# Patient Record
Sex: FEMALE | Race: WHITE | URBAN - METROPOLITAN AREA
[De-identification: names, ages, dates, MRNs, and addresses within clinical notes are randomized per-mention and may not be internally consistent; named-entity substitution may affect disease eponyms.]

---

## 2017-01-03 ENCOUNTER — OFFICE VISIT (OUTPATIENT)
Dept: INTERNAL MEDICINE CLINIC | Age: 37
End: 2017-01-03

## 2017-01-03 VITALS
DIASTOLIC BLOOD PRESSURE: 88 MMHG | WEIGHT: 178 LBS | RESPIRATION RATE: 18 BRPM | BODY MASS INDEX: 28.61 KG/M2 | TEMPERATURE: 97.9 F | HEART RATE: 57 BPM | OXYGEN SATURATION: 99 % | SYSTOLIC BLOOD PRESSURE: 134 MMHG | HEIGHT: 66 IN

## 2017-01-03 DIAGNOSIS — F32.A DEPRESSION, UNSPECIFIED DEPRESSION TYPE: Primary | ICD-10-CM

## 2017-01-03 NOTE — MR AVS SNAPSHOT
Visit Information Date & Time Provider Department Dept. Phone Encounter #  
 1/3/2017  4:15 PM [facility] [phone] [id number] Follow-up Instructions Return in about 2 months (around 3/3/2017) for depression and moodiness, med check. Upcoming Health Maintenance Date Due  
 PAP AKA CERVICAL CYTOLOGY 12/14/2018 DTaP/Tdap/Td series (2 - Td) 12/14/2025 Allergies as of 1/3/2017  Review Complete On: 1/3/2017 By: Devonte Field MD  
  
 Severity Noted Reaction Type Reactions Cephalexin  04/05/2012    Hives Current Immunizations  Never Reviewed Name Date  
 TB Skin Test (PPD) Intradermal 7/18/2015 Tdap 12/14/2015  5:09 PM  
  
 Not reviewed this visit You Were Diagnosed With   
  
 Codes Comments Depression, unspecified depression type    -  Primary ICD-10-CM: F32.9 ICD-9-CM: 181 Vitals BP Pulse Temp Resp Height(growth percentile) Weight(growth percentile) 134/88 (!) 57 97.9 °F (36.6 °C) (Oral) 18 5' 6\" (1.676 m) 178 lb (80.7 kg) LMP SpO2 BMI OB Status Smoking Status 12/26/2016 99% 28.73 kg/m2 Having regular periods Never Smoker BMI and BSA Data Body Mass Index Body Surface Area 28.73 kg/m 2 1.94 m 2 Preferred Pharmacy Pharmacy Name Phone [facility] [address] Your Updated Medication List  
  
   
This list is accurate as of: 1/3/17  4:37 PM.  Always use your most recent med list.  
  
  
  
  
 buPROPion  mg tablet Commonly known as:  Carlos Misa Take 1 Tab by mouth every morning. HAIR AND SKIN PO Take  by mouth.  
  
 multivitamin tablet Commonly known as:  ONE A DAY Take 1 Tab by mouth daily. Follow-up Instructions Return in about 2 months (around 3/3/2017) for depression and moodiness, med check. Introducing [facility] & HEALTH SERVICES! Dear Edna Mireles: Thank you for requesting a [employer] account. Our records indicate that you already have an active [employer] account. You can access your account anytime at [email or web address] Did you know that you can access your hospital and ER discharge instructions at any time in [employer]? You can also review all of your test results from your hospital stay or ER visit. Additional Information If you have questions, please visit the Frequently Asked Questions section of the [employer] website at [email or web address] Remember, [employer] is NOT to be used for urgent needs. For medical emergencies, dial 911. Now available from your iPhone and Android! Please provide this summary of care documentation to your next provider. Your primary care clinician is listed as [facility]. If you have any questions after today's visit, please call [phone].

## 2017-01-03 NOTE — PROGRESS NOTES
HISTORY OF PRESENT ILLNESS  Ho Werner is a 39 y.o. female. Visit Vitals    /88    Pulse (!) 57    Temp 97.9 °F (36.6 °C) (Oral)    Resp 18    Ht 5' 6\" (1.676 m)    Wt 178 lb (80.7 kg)    LMP 12/26/2016    SpO2 99%    BMI 28.73 kg/m2       HPI Comments: Started back on wellbutrin and noted feeling better after a couple of weeks. Less stressed. Less oscar. More positive energy. Joined weight watchers. Will be going to [place] soon for a month. Depression   The history is provided by the patient. This is a chronic problem. The current episode started more than 1 week ago. The problem occurs daily. The problem has been gradually improving. The symptoms are relieved by medications (wellbutrin). Treatments tried: wellbutrin. The treatment provided moderate relief. Medication Evaluation         Review of Systems   Psychiatric/Behavioral: Positive for depression. Physical Exam   Constitutional: She is oriented to person, place, and time. She appears well-developed and well-nourished. No distress. Neurological: She is alert and oriented to person, place, and time. Skin: Skin is warm and dry. She is not diaphoretic. Psychiatric: She has a normal mood and affect. Her speech is normal and behavior is normal. Thought content normal.   Looks very upbeat today. Nursing note and vitals reviewed. ASSESSMENT and PLAN    ICD-10-CM ICD-9-CM    1. Depression, unspecified depression type F32.9 311        Doing better Will continue same. Reviewed hx again tiff related to meds    Will f/u 2 months and reassess whether she wants to stay on the wellbutrin for a while longer or try off in the summer        Incidentally weight discussed. Pt already has set up [employer] for herself.

## 2017-01-03 NOTE — PROGRESS NOTES
ROOM # 3    Pt presents today for 1 month follow up regarding restarting Wellbutrin. Pt states that she is feeling well. Pt preferred language for health care discussion is english. Is someone accompanying this pt? no    Is the patient using any DME equipment during OV? no    Depression Screening completed. Active Dx    Learning Assessment completed. Yes    Abuse Screening completed. Yes    Health Maintenance reviewed and discussed per provider. Yes, up to date    Advance Directive:  1. Do you have an advance directive in place? Patient Reply: No    2. If not, would you like material regarding how to put one in place? Patient Reply: No    Coordination of Care:  1. Have you been to the ER, urgent care clinic since your last visit? Hospitalized since your last visit? no    2. Have you seen or consulted any other health care providers outside of the [address] [place] [address] since your last visit? Include any pap smears or colon screening.  no

## 2017-04-11 ENCOUNTER — OFFICE VISIT (OUTPATIENT)
Dept: INTERNAL MEDICINE CLINIC | Age: 37
End: 2017-04-11

## 2017-04-11 VITALS
DIASTOLIC BLOOD PRESSURE: 76 MMHG | WEIGHT: 167 LBS | OXYGEN SATURATION: 98 % | HEIGHT: 66 IN | BODY MASS INDEX: 26.84 KG/M2 | HEART RATE: 75 BPM | TEMPERATURE: 97.5 F | SYSTOLIC BLOOD PRESSURE: 117 MMHG | RESPIRATION RATE: 15 BRPM

## 2017-04-11 DIAGNOSIS — F32.A DEPRESSION, UNSPECIFIED DEPRESSION TYPE: Primary | ICD-10-CM

## 2017-04-11 RX ORDER — LANOLIN ALCOHOL/MO/W.PET/CERES
500 CREAM (GRAM) TOPICAL DAILY
COMMUNITY

## 2017-04-11 NOTE — PROGRESS NOTES
HISTORY OF PRESENT ILLNESS  Sydney Pearl is a 40 y.o. female. Visit Vitals    /76    Pulse 75    Temp 97.5 °F (36.4 °C) (Oral)    Resp 15    Ht 5' 6\" (1.676 m)    Wt 167 lb (75.8 kg)    LMP 03/22/2017 (Exact Date)    SpO2 98%    BMI 26.95 kg/m2       HPI Comments: Pt feels better--more even keeled--on wellbutrin. Less anxiety. She feels like this is a good place to be. But she has questions about pregnancy. She and her  are trying to get pregnant. Reviewed the current recommendations re wellbutrin which is Category C in [address] [place] [address],[address], category B in [facility]. She will discuss with OB-GYN also      Depression   The history is provided by the patient (see comments). This is a chronic problem. The current episode started more than 1 week ago. The problem occurs daily. The problem has been rapidly improving. Pertinent negatives include no chest pain and no shortness of breath. Review of Systems   Constitutional: Negative. Respiratory: Negative for shortness of breath. Cardiovascular: Negative for chest pain and palpitations. Psychiatric/Behavioral: Positive for depression. Physical Exam   Constitutional: She is oriented to person, place, and time. She appears well-developed and well-nourished. No distress. Cardiovascular: Normal rate. Pulmonary/Chest: Effort normal.   Musculoskeletal: She exhibits no edema. Neurological: She is alert and oriented to person, place, and time. Skin: Skin is warm and dry. She is not diaphoretic. Psychiatric: She has a normal mood and affect. Nursing note and vitals reviewed. ASSESSMENT and PLAN    ICD-10-CM ICD-9-CM    1. Depression, unspecified depression type F32.9 311        Doing well. Losing weight. Staying more active and exercising. Will continue same unless ob-gyn says otherwise. Depression/anxiety doing better.      F/u 3-4 months for recheck

## 2017-04-11 NOTE — MR AVS SNAPSHOT
Visit Information Date & Time Provider Department Dept. Phone Encounter #  
 4/11/2017  4:15 PM Bhakti Raissa, [employer] [phone] [id number] Follow-up Instructions Return in about 4 months (around 7/31/2017) for depression/anxiety recheck. Lakisha Cai Upcoming Health Maintenance Date Due  
 PAP AKA CERVICAL CYTOLOGY 12/14/2018 DTaP/Tdap/Td series (2 - Td) 12/14/2025 Allergies as of 4/11/2017  Review Complete On: 4/11/2017 By: Bhakti Haji MD  
  
 Severity Noted Reaction Type Reactions Cephalexin  04/05/2012    Hives Current Immunizations  Never Reviewed Name Date  
 TB Skin Test (PPD) Intradermal 7/18/2015 Tdap 12/14/2015  5:09 PM  
  
 Not reviewed this visit You Were Diagnosed With   
  
 Codes Comments Depression, unspecified depression type    -  Primary ICD-10-CM: F32.9 ICD-9-CM: 651 Vitals BP Pulse Temp Resp Height(growth percentile) Weight(growth percentile) 117/76 75 97.5 °F (36.4 °C) (Oral) 15 5' 6\" (1.676 m) 167 lb (75.8 kg) LMP SpO2 BMI OB Status Smoking Status 03/22/2017 (Exact Date) 98% 26.95 kg/m2 Having regular periods Never Smoker Vitals History BMI and BSA Data Body Mass Index Body Surface Area  
 26.95 kg/m 2 1.88 m 2 [facility]erred [facility]acy Pharm[facility] Phone [facility], [address] Your Updated Medication List  
  
   
This list is accurate as of: 4/11/17  4:53 PM.  Always use your most recent med list.  
  
  
  
  
 buPROPion  mg tablet Commonly known as:  Almarie Gideon Take 1 Tab by mouth every morning. HAIR AND SKIN PO Take  by mouth.  
  
 multivitamin tablet Commonly known as:  ONE A DAY Take 1 Tab by mouth daily. VITAMIN B-12 500 mcg tablet Generic drug:  cyanocobalamin Take 500 mcg by mouth daily. Follow-up Instructions Return in about 4 months (around 7/31/2017) for depression/anxiety recheck. Elizabeth Sterling Introducing [facility] SERVICES! Dear Cheli Gonzalez: 
Thank you for requesting a [employer] account. Our records indicate that you already have an active [employer] account. You can access your account anytime at [email or web address] Did you know that you can access your hospital and ER discharge instructions at any time in [employer]? You can also review all of your test results from your hospital stay or ER visit. Additional Information If you have questions, please visit the Frequently Asked Questions section of the [employer] website at [email or web address] Remember, [employer] is NOT to be used for urgent needs. For medical emergencies, dial 911. Now available from your iPhone and Android! Please provide this summary of care documentation to your next provider. Your primary care clinician is listed as [facility]. If you have any questions after today's visit, please call [phone].

## 2017-04-11 NOTE — PROGRESS NOTES
ROOM # 4    Candido Polk Cecilio presents today for   Chief Complaint   Patient presents with    Depression     f/u        Candido Hernandes preferred language for health care discussion is english/other. Is someone accompanying this pt? no    Is the patient using any DME equipment during OV? no    Depression Screening:  PHQ 2 / 9, over the last two weeks 12/14/2015   Little interest or pleasure in doing things Nearly every day   Feeling down, depressed or hopeless More than half the days   Total Score PHQ 2 5       Learning Assessment:  Learning Assessment 12/14/2015   PRIMARY LEARNER Patient   PRIMARY LANGUAGE ENGLISH   LEARNER PREFERENCE PRIMARY DEMONSTRATION   ANSWERED BY Ronnie Sauceda   RELATIONSHIP SELF       Abuse Screening:  Abuse Screening Questionnaire 12/14/2015   Do you ever feel afraid of your partner? N   Are you in a relationship with someone who physically or mentally threatens you? N   Is it safe for you to go home? Y       Fall Risk  No flowsheet data found. Health Maintenance reviewed and discussed per provider. Yes        Advance Directive:  1. Do you have an advance directive in place? Patient Reply: no    2. If not, would you like material regarding how to put one in place? Patient Reply: no    Coordination of Care:  1. Have you been to the ER, urgent care clinic since your last visit? Hospitalized since your last visit? no    2. Have you seen or consulted any other health care providers outside of the [address] [place] [address] since your last visit? Include any pap smears or colon screening.  no

## 2017-06-15 DIAGNOSIS — R63.5 WEIGHT GAIN: ICD-10-CM

## 2017-06-15 DIAGNOSIS — F32.A MOOD DISORDER OF DEPRESSED TYPE: ICD-10-CM

## 2017-06-15 RX ORDER — BUPROPION HYDROCHLORIDE 150 MG/1
150 TABLET ORAL
Qty: 30 TAB | Refills: 5 | Status: SHIPPED | OUTPATIENT
Start: 2017-06-15 | End: 2017-06-16 | Stop reason: SDUPTHER

## 2017-06-15 NOTE — TELEPHONE ENCOUNTER
Requested Prescriptions     Pending Prescriptions Disp Refills    buPROPion XL (WELLBUTRIN XL) 150 mg tablet 30 Tab 5     Sig: Take 1 Tab by mouth every morning.

## 2017-06-16 DIAGNOSIS — R63.5 WEIGHT GAIN: ICD-10-CM

## 2017-06-16 DIAGNOSIS — F32.A MOOD DISORDER OF DEPRESSED TYPE: ICD-10-CM

## 2017-06-16 RX ORDER — BUPROPION HYDROCHLORIDE 150 MG/1
150 TABLET ORAL
Qty: 30 TAB | Refills: 5 | Status: SHIPPED | OUTPATIENT
Start: 2017-06-16 | End: 2019-05-01 | Stop reason: SDUPTHER

## 2018-01-31 ENCOUNTER — OFFICE VISIT (OUTPATIENT)
Dept: INTERNAL MEDICINE CLINIC | Age: 38
End: 2018-01-31

## 2018-01-31 VITALS
HEIGHT: 66 IN | WEIGHT: 177 LBS | BODY MASS INDEX: 28.45 KG/M2 | RESPIRATION RATE: 15 BRPM | DIASTOLIC BLOOD PRESSURE: 89 MMHG | SYSTOLIC BLOOD PRESSURE: 130 MMHG | OXYGEN SATURATION: 99 % | HEART RATE: 80 BPM | TEMPERATURE: 98.9 F

## 2018-01-31 DIAGNOSIS — R68.89 FLU-LIKE SYMPTOMS: Primary | ICD-10-CM

## 2018-01-31 DIAGNOSIS — J11.1 INFLUENZA: ICD-10-CM

## 2018-01-31 LAB
QUICKVUE INFLUENZA TEST: POSITIVE
VALID INTERNAL CONTROL?: YES

## 2018-01-31 RX ORDER — OSELTAMIVIR PHOSPHATE 75 MG/1
75 CAPSULE ORAL 2 TIMES DAILY
Qty: 10 CAP | Refills: 0 | Status: SHIPPED | OUTPATIENT
Start: 2018-01-31 | End: 2018-02-05

## 2018-01-31 NOTE — PROGRESS NOTES
ROOM # 4    Josr Hernandes presents today for   Chief Complaint   Patient presents with    Generalized Body Aches     flu like symptoms       Josr Hernandes preferred language for health care discussion is english/other. Is someone accompanying this pt? no    Is the patient using any DME equipment during [address]? no    Depression Screening:  PHQ over the last two weeks 4/11/2017 12/14/2015   Little interest or pleasure in doing things Several days Nearly every day   Feeling down, depressed or hopeless Several days More than half the days   Total Score PHQ 2 2 5   Trouble falling or staying asleep, or sleeping too much Several days -   Feeling tired or having little energy Several days -   Poor appetite or overeating Not at all -   Feeling bad about yourself - or that you are a failure or have let yourself or your family down Several days -   Trouble concentrating on things such as school, work, reading or watching TV Several days -   Moving or speaking so slowly that other people could have noticed; or the opposite being so fidgety that others notice Not at all -   Thoughts of being better off dead, or hurting yourself in some way Not at all -   PHQ 9 Score 6 -   How difficult have these problems made it for you to do your work, take care of your home and get along with others Not difficult at all -       Learning Assessment:  Learning Assessment 12/14/2015   PRIMARY LEARNER Patient   PRIMARY LANGUAGE ENGLISH   LEARNER PREFERENCE PRIMARY DEMONSTRATION   ANSWERED BY Lizzeth Lawton   RELATIONSHIP SELF       Abuse Screening:  Abuse Screening Questionnaire 12/14/2015   Do you ever feel afraid of your partner? N   Are you in a relationship with someone who physically or mentally threatens you? N   Is it safe for you to go home? Y       Fall Risk  No flowsheet data found. Health Maintenance reviewed and discussed per provider. Yes    Josr Hernandes is due for flu vaccine.   Please order/place referral if appropriate. Advance Directive:  1. Do you have an advance directive in place? Patient Reply: no    2. If not, would you like material regarding how to put one in place? Patient Reply: no    Coordination of Care:  1. Have you been to the ER, urgent care clinic since your last visit? Hospitalized since your last visit? no    2. Have you seen or consulted any other health care providers outside of the [address] [place] [address] since your last visit? Include any pap smears or colon screening.  no

## 2018-01-31 NOTE — PROGRESS NOTES
HISTORY OF PRESENT ILLNESS  Dakota Hull is a 40 y.o. female. Visit Vitals    /89    Pulse 80    Temp 98.9 °F (37.2 °C) (Oral)    Resp 15    Ht 5' 6\" (1.676 m)    Wt 177 lb (80.3 kg)    LMP 01/30/2018 (Exact Date)    SpO2 99%    BMI 28.57 kg/m2       HPI Comments: Slight fevers, fatigue, body aches, cough. Stayed home yesterday due to feeling ill    Generalized Body Aches   The history is provided by the patient. This is a new problem. The current episode started 2 days ago. The problem occurs daily. The problem has not changed since onset. Pertinent negatives include no shortness of breath. Review of Systems   Constitutional: Positive for fever. Negative for chills. HENT: Positive for sore throat. Negative for congestion. Respiratory: Positive for cough. Negative for sputum production and shortness of breath. Cardiovascular: Negative. Physical Exam   Constitutional: She is oriented to person, place, and time. She appears well-developed and well-nourished. No distress. HENT:   Right Ear: Tympanic membrane, external ear and ear canal normal.   Left Ear: Tympanic membrane, external ear and ear canal normal.   Nose: Mucosal edema present. Mouth/Throat: Uvula is midline, oropharynx is clear and moist and mucous membranes are normal.   Cardiovascular: Normal rate. Pulmonary/Chest: Effort normal and breath sounds normal. No respiratory distress. She has no wheezes. She has no rales. Musculoskeletal: She exhibits no edema. Neurological: She is alert and oriented to person, place, and time. Skin: Skin is warm and dry. She is not diaphoretic. Psychiatric: She has a normal mood and affect. Nursing note and vitals reviewed. ASSESSMENT and PLAN    ICD-10-CM ICD-9-CM    1. Flu-like symptoms R68.89 780.99 AMB POC RAPID INFLUENZA TEST      oseltamivir (TAMIFLU) 75 mg capsule   2.  Influenza J11.1 487.1     A strain       Flu swab + influenza A    Will tx with Tamiflu and off work the rest of the week.     Rest and fluids, tylenol

## 2018-01-31 NOTE — MR AVS SNAPSHOT
[facility] 
 
 
 [address] [facility] [address] 
[phone] Patient: Larisa Cortes MRN: [id number] BDM:[birth year] Visit Information Date & Time Provider Department Dept. Phone Encounter #  
 1/31/2018  1:30 PM Jaja Payne, 411 [facility] [id number] Follow-up Instructions Return if symptoms worsen or fail to improve. Upcoming Health Maintenance Date Due Influenza Age 5 to Adult 8/1/2017 PAP AKA CERVICAL CYTOLOGY 12/14/2018 DTaP/Tdap/Td series (2 - Td) 12/14/2025 Allergies as of 1/31/2018  Review Complete On: 1/31/2018 By: Jaja Payne MD  
  
 Severity Noted Reaction Type Reactions Cephalexin  04/05/2012    Hives Current Immunizations  Never Reviewed Name Date  
 TB Skin Test (PPD) Intradermal 7/18/2015 Tdap 12/14/2015  5:09 PM  
  
 Not reviewed this visit You Were Diagnosed With   
  
 Codes Comments Flu-like symptoms    -  Primary ICD-10-CM: R68.89 ICD-9-CM: 780.99 Influenza     ICD-10-CM: J11.1 ICD-9-CM: 487.1 A strain Vitals BP Pulse Temp Resp Height(growth percentile) Weight(growth percentile) 130/89 80 98.9 °F (37.2 °C) (Oral) 15 5' 6\" (1.676 m) 177 lb (80.3 kg) LMP SpO2 BMI OB Status Smoking Status 01/30/2018 (Exact Date) 99% 28.57 kg/m2 Having regular periods Never Smoker BMI and BSA Data Body Mass Index Body Surface Area 28.57 kg/m 2 1.93 m 2 Preferred Pharmacy Pharmacy Name Phone [facility] [address], [address] [place] [phone] [facility] Your Updated Medication List  
  
   
This list is accurate as of: 1/31/18  1:55 PM.  Always use your most recent med list.  
  
  
  
  
 buPROPion  mg tablet Commonly known as:  Ramon Dress Take 1 Tab by mouth every morning. HAIR AND SKIN PO Take  by mouth.  
  
 multivitamin tablet Commonly known as:  ONE A DAY Take 1 Tab by mouth daily. oseltamivir 75 mg capsule Commonly known as:  TAMIFLU Take 1 Cap by mouth two (2) times a day for 5 days. VITAMIN B-12 500 mcg tablet Generic drug:  cyanocobalamin Take 500 mcg by mouth daily. Prescriptions Sent to Pharmacy Refills  
 oseltamivir (TAMIFLU) 75 mg capsule 0 Sig: Take 1 Cap by mouth two (2) times a day for 5 days. Class: Normal  
 Pharmacy: [address], [address] #: [phone] Route: Oral  
  
We Performed the Following AMB POC RAPID INFLUENZA TEST [58544 CPT(R)] Follow-up Instructions Return if symptoms worsen or fail to improve. Patient Instructions Influenza (Flu): Care Instructions Your Care Instructions Influenza (flu) is an infection in the lungs and breathing passages. It is caused by the influenza virus. There are different strains, or types, of the flu virus from year to year. Unlike the common cold, the flu comes on suddenly and the symptoms, such as a cough, congestion, fever, chills, fatigue, aches, and pains, are more severe. These symptoms may last up to 10 days. Although the flu can make you feel very sick, it usually doesn't cause serious health problems. Home treatment is usually all you need for flu symptoms. But your doctor may prescribe antiviral medicine to prevent other health problems, such as pneumonia, from developing. Older people and those who have a long-term health condition, such as lung disease, are most at risk for having pneumonia or other health problems. Follow-up care is a key part of your treatment and safety. Be sure to make and go to all appointments, and call your doctor if you are having problems. It's also a good idea to know your test results and keep a list of the medicines you take. How can you care for yourself at home?  
· Get plenty of rest. 
 · Drink plenty of fluids, enough so that your urine is light yellow or clear like water. If you have kidney, heart, or liver disease and have to limit fluids, talk with your doctor before you increase the amount of fluids you drink. · Take an over-the-counter pain medicine if needed, such as acetaminophen (Tylenol), ibuprofen (Advil, Motrin), or naproxen (Aleve), to relieve fever, headache, and muscle aches. Read and follow all instructions on the label. No one younger than 20 should take aspirin. It has been linked to Reye syndrome, a serious illness. · Do not smoke. Smoking can make the flu worse. If you need help quitting, talk to your doctor about stop-smoking programs and medicines. These can increase your chances of quitting for good. · Breathe moist air from a hot shower or from a sink filled with hot water to help clear a stuffy nose. · Before you use cough and cold medicines, check the label. These medicines may not be safe for young children or for people with certain health problems. · If the skin around your nose and lips becomes sore, put some petroleum jelly on the area. · To ease coughing: ¨ Drink fluids to soothe a scratchy throat. ¨ Suck on cough drops or plain hard candy. ¨ Take an over-the-counter cough medicine that contains dextromethorphan to help you get some sleep. Read and follow all instructions on the label. ¨ Raise your head at night with an extra pillow. This may help you rest if coughing keeps you awake. · Take any prescribed medicine exactly as directed. Call your doctor if you think you are having a problem with your medicine. To avoid spreading the flu · Wash your hands regularly, and keep your hands away from your face. · Stay home from school, work, and other public places until you are feeling better and your fever has been gone for at least 24 hours. The fever needs to have gone away on its own without the help of medicine. · Ask people living with you to talk to their doctors about preventing the flu. They may get antiviral medicine to keep from getting the flu from you. · To prevent the flu in the future, get a flu vaccine every fall. Encourage people living with you to get the vaccine. · Cover your mouth when you cough or sneeze. When should you call for help? Call 911 anytime you think you may need emergency care. For example, call if: 
? · You have severe trouble breathing. ?Call your doctor now or seek immediate medical care if: 
? · You have new or worse trouble breathing. ? · You seem to be getting much sicker. ? · You feel very sleepy or confused. ? · You have a new or higher fever. ? · You get a new rash. ? Watch closely for changes in your health, and be sure to contact your doctor if: 
? · You begin to get better and then get worse. ? · You are not getting better after 1 week. Where can you learn more? Go to http://madeleine-bina.info/. Enter U785 in the search box to learn more about \"Influenza (Flu): Care Instructions. \" Current as of: May 12, 2017 Content Version: 11.4 © 4357-4907 Regaalo. Care instructions adapted under license by [employer] (which disclaims liability or warranty for this information). If you have questions about a medical condition or this instruction, always ask your healthcare professional. [place] [address] any warranty or liability for your use of this information. Introducing [facility]! Dear Kimberley Monique: 
Thank you for requesting a AlwaysFashion account. Our records indicate that you already have an active AlwaysFashion account. You can access your account anytime at [email or web address] Did you know that you can access your hospital and ER discharge instructions at any time in AlwaysFashion? You can also review all of your test results from your hospital stay or ER visit. Additional Information If you have questions, please visit the Frequently Asked Questions section of the Apprion website at [email or web address] ([email or web address]). [email or web address] Remember, Apprion is NOT to be used for urgent needs. For medical emergencies, dial 911. Now available from your iPhone and Android! Please provide this summary of care documentation to your next provider. Your primary care clinician is listed as [facility]. If you have any questions after today's visit, please call [phone].

## 2018-01-31 NOTE — PATIENT INSTRUCTIONS

## 2018-01-31 NOTE — LETTER
NOTIFICATION RETURN TO WORK / SCHOOL 
 
1/31/2018 1:52 PM 
 
Ms. Almonte Dense [facility] [address] To Whom It May Concern: 
 
Leanna Mc is currently under the care of Ajay Martinez. She will return to work/school on: Monday, February 5, 2018 If there are questions or concerns please have the patient contact our office. Sincerely, Dimas Londono MD

## 2018-02-06 ENCOUNTER — OFFICE VISIT (OUTPATIENT)
Dept: INTERNAL MEDICINE CLINIC | Age: 38
End: 2018-02-06

## 2018-02-06 VITALS
BODY MASS INDEX: 27.8 KG/M2 | SYSTOLIC BLOOD PRESSURE: 139 MMHG | HEART RATE: 65 BPM | RESPIRATION RATE: 16 BRPM | WEIGHT: 173 LBS | DIASTOLIC BLOOD PRESSURE: 90 MMHG | TEMPERATURE: 97.3 F | HEIGHT: 66 IN | OXYGEN SATURATION: 97 %

## 2018-02-06 DIAGNOSIS — R42 DIZZINESS: Primary | ICD-10-CM

## 2018-02-06 RX ORDER — MECLIZINE HCL 12.5 MG 12.5 MG/1
12.5 TABLET ORAL
Qty: 21 TAB | Refills: 0 | Status: SHIPPED | OUTPATIENT
Start: 2018-02-06 | End: 2018-02-16

## 2018-02-06 NOTE — PROGRESS NOTES
Rm 3  Pt presents to the clinic complaining of dizziness and nausea x 1 day. Pt is recovering from the flu and believes the dizziness and nausea is not related. Flu Shot Requested: no    Depression Screening:  PHQ over the last two weeks 4/11/2017 12/14/2015   Little interest or pleasure in doing things Several days Nearly every day   Feeling down, depressed or hopeless Several days More than half the days   Total Score PHQ 2 2 5   Trouble falling or staying asleep, or sleeping too much Several days -   Feeling tired or having little energy Several days -   Poor appetite or overeating Not at all -   Feeling bad about yourself - or that you are a failure or have let yourself or your family down Several days -   Trouble concentrating on things such as school, work, reading or watching TV Several days -   Moving or speaking so slowly that other people could have noticed; or the opposite being so fidgety that others notice Not at all -   Thoughts of being better off dead, or hurting yourself in some way Not at all -   PHQ 9 Score 6 -   How difficult have these problems made it for you to do your work, take care of your home and get along with others Not difficult at all -       Learning Assessment:  Learning Assessment 12/14/2015   PRIMARY LEARNER Patient   PRIMARY LANGUAGE ENGLISH   LEARNER PREFERENCE PRIMARY DEMONSTRATION   ANSWERED BY Rina Olivas   RELATIONSHIP SELF       Abuse Screening:  Abuse Screening Questionnaire 12/14/2015   Do you ever feel afraid of your partner? N   Are you in a relationship with someone who physically or mentally threatens you? N   Is it safe for you to go home? Y       Health Maintenance reviewed and discussed per provider: yes     Coordination of Care:    1. Have you been to the ER, urgent care clinic since your last visit? Hospitalized since your last visit? no    2.  Have you seen or consulted any other health care providers outside of the [address] [place] [address] since your last visit? Include any pap smears or colon screening.  no

## 2018-02-06 NOTE — MR AVS SNAPSHOT
Tammi Pulse 
 
 
 [address] [facility] 83 [address] 
[phone] Patient: Sanaz Gilmore MRN: [id number] LVU:[birth year] Visit Information Date & Time Provider Department Dept. Phone Encounter #  
 2/6/2018  8:30 AM Aldona Closs, NP [facility] [phone] [id number] Follow-up Instructions Return if symptoms worsen or fail to improve. Upcoming Health Maintenance Date Due Influenza Age 5 to Adult 8/1/2017 PAP AKA CERVICAL CYTOLOGY 12/14/2018 DTaP/Tdap/Td series (2 - Td) 12/14/2025 Allergies as of 2/6/2018  Review Complete On: 2/6/2018 By: Emanuel James LPN Severity Noted Reaction Type Reactions Cephalexin  04/05/2012    Hives Current Immunizations  Never Reviewed Name Date  
 TB Skin Test (PPD) Intradermal 7/18/2015 Tdap 12/14/2015  5:09 PM  
  
 Not reviewed this visit You Were Diagnosed With   
  
 Codes Comments Dizziness    -  Primary ICD-10-CM: Y38 ICD-9-CM: 780.4 Vitals BP Pulse Temp Resp Height(growth percentile) Weight(growth percentile) 139/90 (BP 1 Location: Right arm, BP Patient Position: Sitting) 65 97.3 °F (36.3 °C) (Oral) 16 5' 6\" (1.676 m) 173 lb (78.5 kg) LMP SpO2 BMI OB Status Smoking Status 01/30/2018 (Exact Date) 97% 27.92 kg/m2 Having regular periods Never Smoker Vitals History BMI and BSA Data Body Mass Index Body Surface Area  
 27.92 kg/m 2 1.91 m 2 Preferred Pharmacy Pharmacy Name Phone [facility] [address], [address] Your Updated Medication List  
  
   
This list is accurate as of: 2/6/18  8:52 AM.  Always use your most recent med list.  
  
  
  
  
 buPROPion  mg tablet Commonly known as:  Homero Keep Take 1 Tab by mouth every morning. HAIR AND SKIN PO Take  by mouth.  
  
 meclizine 12.5 mg tablet Commonly known as:  ANTIVERT Take 1 Tab by mouth three (3) times daily as needed for up to 10 days. Indications: Vertigo  
  
 multivitamin tablet Commonly known as:  ONE A DAY Take 1 Tab by mouth daily. VITAMIN B-12 500 mcg tablet Generic drug:  cyanocobalamin Take 500 mcg by mouth daily. Prescriptions Sent to Pharmacy Refills  
 meclizine (ANTIVERT) 12.5 mg tablet 0 Sig: Take 1 Tab by mouth three (3) times daily as needed for up to 10 days. Indications: Vertigo Class: Normal  
 Pharmacy: [address], [address] #: [phone] Route: Oral  
  
Follow-up Instructions Return if symptoms worsen or fail to improve. Patient Instructions Vertigo: Care Instructions Your Care Instructions Vertigo is the feeling that you or your surroundings are moving when there is no actual movement. It is often described as a feeling of spinning, whirling, falling, or tilting. Vertigo may make you vomit or feel nauseated. You may have trouble standing or walking and may lose your balance. Vertigo is often related to an inner ear problem, but it can have other more serious causes. If vertigo continues, you may need more tests to find its cause. Follow-up care is a key part of your treatment and safety. Be sure to make and go to all appointments, and call your doctor if you are having problems. It's also a good idea to know your test results and keep a list of the medicines you take. How can you care for yourself at home? · Do not lie flat on your back. Prop yourself up slightly. This may reduce the spinning feeling. Keep your eyes open. · Move slowly so that you do not fall. · If your doctor recommends medicine, take it exactly as directed. · Do not drive while you are having vertigo. Certain exercises, called Bermudez-Daroff exercises, can help decrease vertigo. To do Bermudez-Daroff exercises: · Sit on the edge of a bed or sofa and quickly lie down on the side that causes the worst vertigo. Lie on your side with your ear down. · Stay in this position for at least 30 seconds or until the vertigo goes away. · Sit up. If this causes vertigo, wait for it to stop. · Repeat the procedure on the other side. · Repeat this 10 times. Do these exercises 2 times a day until the vertigo is gone. When should you call for help? Call 911 anytime you think you may need emergency care. For example, call if: 
? · You passed out (lost consciousness). ? · You have symptoms of a stroke. These may include: 
¨ Sudden numbness, tingling, weakness, or loss of movement in your face, arm, or leg, especially on only one side of your body. ¨ Sudden vision changes. ¨ Sudden trouble speaking. ¨ Sudden confusion or trouble understanding simple statements. ¨ Sudden problems with walking or balance. ¨ A sudden, severe headache that is different from past headaches. ?Call your doctor now or seek immediate medical care if: 
? · Vertigo occurs with a fever, a headache, or ringing in your ears. ? · You have new or increased nausea and vomiting. ? Watch closely for changes in your health, and be sure to contact your doctor if: 
? · Vertigo gets worse or happens more often. ? · Vertigo has not gotten better after 2 weeks. Where can you learn more? Go to http://madeleine-bina.info/. Enter C116 in the search box to learn more about \"Vertigo: Care Instructions. \" Current as of: May 12, 2017 Content Version: 11.4 © 5580-6636 Image Space Media. Care instructions adapted under license by [employer] (which disclaims liability or warranty for this information). If you have questions about a medical condition or this instruction, always ask your healthcare professional. [address] any warranty or liability for your use of this information. Vertigo: Exercises Your Care Instructions Here are some examples of typical rehabilitation exercises for your condition. Start each exercise slowly. Ease off the exercise if you start to have pain. Your doctor or physical therapist will tell you when you can start these exercises and which ones will work best for you. How to do the exercises Exercise 1 1. Stand with a chair in front of you and a wall behind you. If you begin to fall, you may use them for support. 2. Stand with your feet together and your arms at your sides. 3. Move your head up and down 10 times. Exercise 2 1. Move your head side to side 10 times. Exercise 3 1. Move your head diagonally up and down 10 times. Exercise 4 1. Move your head diagonally up and down 10 times on the other side. Follow-up care is a key part of your treatment and safety. Be sure to make and go to all appointments, and call your doctor if you are having problems. It's also a good idea to know your test results and keep a list of the medicines you take. Where can you learn more? Go to http://madeleine-bina.info/. Enter F349 in the search box to learn more about \"Vertigo: Exercises. \" Current as of: May 4, 2017 Content Version: 11.4 © 0935-3734 Healthwise, Incorporated. Care instructions adapted under license by [employer]er (which disclaims liability or warranty for this information). If you have questions about a medical condition or this instruction, always ask your healthcare professional. [place] [address] any warranty or liability for your use of this information. Introducing [facility] SERVICES! Dear Lamberto Miller: 
Thank you for requesting a Solus Biosystems account. Our records indicate that you already have an active Sol[employer] Biosystems account. You can access your account anytime at [email or web address]. [email or web address] Therapeutics Did you know that you can access your hospital and ER discharge instructions at any time in [employer]o [employer]? You can also review all of your test results from your hospital stay or ER visit. Additional Information If you have questions, please visit the Frequently Asked Questions section of the [employer]o [employer]astics website at [email or web address] [email or web address] Remember, [employer]o Plastics is NOT to be used for urgent needs. For medical emergencies, dial 911. Now available from your iPhone and Android! Please provide this summary of care documentation to your next provider. Your primary care clinician is listed as [facility]. If you have any questions after today's visit, please call [phone].

## 2018-02-06 NOTE — PROGRESS NOTES
HISTORY OF PRESENT ILLNESS  Rishi Bhagat is a 40 y.o. female. Patient of Dr Raymon Goldberg presents with dizziness associated with nausea x 1 day. States she got off Flu treatment with Tamiflu but does not have any residual symptoms. States she has not taken her Wellbutrin x 3 weeks. States she felt woozy,untsable  and nauseous from sitting to standing positions and when she turned her head on one side. Patient denies any head trauma/injury, states she has had enough sleep/rest in the past four days, she believes she drinks enough water and denies any hx of anemia or migraine. Patient denies any palpitations, SOB,CP, slurred speech,unilateral weakness, facial drooping/drooling,HA. Nausea    The history is provided by the patient. This is a new problem. The problem has not changed since onset. There has been no fever. Pertinent negatives include no chills, no fever, no sweats, no abdominal pain, no diarrhea, no headaches, no arthralgias, no myalgias, no cough and no headaches. Dizziness    The history is provided by the patient. The current episode started yesterday. The problem has not changed since onset. There was no loss of consciousness. Associated symptoms include nausea and dizziness. Pertinent negatives include no visual change, no chest pain, no palpitations, no clumsiness, no confusion, no fever, no malaise/fatigue, no abdominal pain, no bowel incontinence, no bladder incontinence, no congestion, no headaches, no back pain, no focal weakness, no light-headedness, no seizures, no slurred speech, no melena, no anal bleeding and no head injury. She has tried nothing for the symptoms. Review of Systems   Constitutional: Negative. Negative for chills, fever and malaise/fatigue. HENT: Negative. Negative for congestion. Respiratory: Negative. Negative for cough. Cardiovascular: Negative for chest pain and palpitations. Gastrointestinal: Positive for nausea.  Negative for abdominal pain, anal bleeding, bowel incontinence, diarrhea and melena. Genitourinary: Negative for bladder incontinence. Musculoskeletal: Negative. Negative for arthralgias, back pain and myalgias. Skin: Negative. Neurological: Positive for dizziness. Negative for focal weakness, seizures, light-headedness and headaches. Psychiatric/Behavioral: Negative. Negative for confusion. Physical Exam   Constitutional: She is oriented to person, place, and time. She appears well-developed and well-nourished. /90 (BP 1 Location: Right arm, BP Patient Position: Sitting)  Pulse 65  Temp 97.3 °F (36.3 °C) (Oral)   Resp 16  Ht 5' 6\" (1.676 m)  Wt 173 lb (78.5 kg)  LMP 01/30/2018 (Exact Date)  SpO2 97%  BMI 27.92 kg/m2     HENT:   Head: Normocephalic and atraumatic. Eyes: Conjunctivae and EOM are normal. Pupils are equal, round, and reactive to light. Neck: Normal range of motion. Cardiovascular: Normal rate. Pulmonary/Chest: Effort normal and breath sounds normal.   Musculoskeletal: Normal range of motion. Neurological: She is alert and oriented to person, place, and time. GCS eye subscore is 4. GCS verbal subscore is 5. GCS motor subscore is 6. Skin: Skin is warm and dry. Psychiatric: She has a normal mood and affect. Her speech is normal and behavior is normal. Judgment and thought content normal. Cognition and memory are normal.   Vitals reviewed. ASSESSMENT and PLAN    ICD-10-CM ICD-9-CM    1. Dizziness R42 780.4 meclizine (ANTIVERT) 12.5 mg tablet     Encounter Diagnoses   Name Primary?  Dizziness Yes     Orders Placed This Encounter    meclizine (ANTIVERT) 12.5 mg tablet     Orders Placed This Encounter    meclizine (ANTIVERT) 12.5 mg tablet     Sig: Take 1 Tab by mouth three (3) times daily as needed for up to 10 days.  Indications: Vertigo     Dispense:  21 Tab     Refill:  0     Orders Placed This Encounter    meclizine (ANTIVERT) 12.5 mg tablet     Diagnoses and all orders for this visit:    1. Dizziness  -     meclizine (ANTIVERT) 12.5 mg tablet; Take 1 Tab by mouth three (3) times daily as needed for up to 10 days. Indications: Vertigo      Follow-up Disposition:  Return if symptoms worsen or fail to improve. current treatment plan is effective, no change in therapy  the following changes in treatment are made: Discussed conservative management with Meclizine, BPPV exercises, rest, f/u with PCP if no improvement for further investigation.

## 2018-02-06 NOTE — PROGRESS NOTES
Patient of Dr Dillan Perez presents with dizziness associated with nausea x 1 day. States she got off Flu treatment with Tamiflu but does not have any residual symptoms. States she has not taken her Wellbutrin x 3 weeks. States she felt woozy,untsable  and nauseous from sitting to standing positions and when she turned her head on one side. Patient denies any head trauma/injury, states she has had enough sleep/rest in the past four days, she believes she drinks enough water and denies any hx of anemia or migraine. Patient denies any palpitations, SOB,CP, slurred speech,unilateral weakness, facial drooping/drooling,HA.

## 2018-02-23 ENCOUNTER — OFFICE VISIT (OUTPATIENT)
Dept: INTERNAL MEDICINE CLINIC | Age: 38
End: 2018-02-23

## 2018-02-23 VITALS
DIASTOLIC BLOOD PRESSURE: 83 MMHG | HEART RATE: 64 BPM | RESPIRATION RATE: 16 BRPM | TEMPERATURE: 97.8 F | SYSTOLIC BLOOD PRESSURE: 127 MMHG | OXYGEN SATURATION: 100 % | BODY MASS INDEX: 27.97 KG/M2 | HEIGHT: 66 IN | WEIGHT: 174 LBS

## 2018-02-23 DIAGNOSIS — R05.9 COUGH: Primary | ICD-10-CM

## 2018-02-23 DIAGNOSIS — R10.9 FLANK PAIN: ICD-10-CM

## 2018-02-23 DIAGNOSIS — R07.89 CHEST WALL PAIN: ICD-10-CM

## 2018-02-23 DIAGNOSIS — R53.83 FATIGUE, UNSPECIFIED TYPE: ICD-10-CM

## 2018-02-23 DIAGNOSIS — R05.9 COUGH: ICD-10-CM

## 2018-02-23 LAB
BILIRUB UR QL STRIP: NEGATIVE
GLUCOSE UR-MCNC: NEGATIVE MG/DL
KETONES P FAST UR STRIP-MCNC: NEGATIVE MG/DL
PH UR STRIP: 5.5 [PH] (ref 4.6–8)
PROT UR QL STRIP: NEGATIVE
SP GR UR STRIP: 1 (ref 1–1.03)
UA UROBILINOGEN AMB POC: NORMAL (ref 0.2–1)
URINALYSIS CLARITY POC: CLEAR
URINALYSIS COLOR POC: YELLOW
URINE BLOOD POC: NORMAL
URINE LEUKOCYTES POC: NORMAL
URINE NITRITES POC: NEGATIVE

## 2018-02-23 RX ORDER — CYCLOBENZAPRINE HCL 5 MG
5 TABLET ORAL
Qty: 10 TAB | Refills: 0 | Status: SHIPPED | OUTPATIENT
Start: 2018-02-23 | End: 2018-03-05

## 2018-02-23 RX ORDER — IBUPROFEN 800 MG/1
800 TABLET ORAL
Qty: 30 TAB | Refills: 0 | Status: SHIPPED | OUTPATIENT
Start: 2018-02-23 | End: 2018-03-05

## 2018-02-23 NOTE — PROGRESS NOTES
Ridge Cunningham presents today for   Chief Complaint   Patient presents with    Flank Pain     right and tightnes in chest after coughing x 2 weeks       Marian Yan-Drought preferred language for health care discussion is english/other. Is someone accompanying this pt? No     Is the patient using any DME equipment during OV? No     Depression Screening:  PHQ over the last two weeks 4/11/2017 12/14/2015   Little interest or pleasure in doing things Several days Nearly every day   Feeling down, depressed or hopeless Several days More than half the days   Total Score PHQ 2 2 5   Trouble falling or staying asleep, or sleeping too much Several days -   Feeling tired or having little energy Several days -   Poor appetite or overeating Not at all -   Feeling bad about yourself - or that you are a failure or have let yourself or your family down Several days -   Trouble concentrating on things such as school, work, reading or watching TV Several days -   Moving or speaking so slowly that other people could have noticed; or the opposite being so fidgety that others notice Not at all -   Thoughts of being better off dead, or hurting yourself in some way Not at all -   PHQ 9 Score 6 -   How difficult have these problems made it for you to do your work, take care of your home and get along with others Not difficult at all -       Learning Assessment:  Learning Assessment 12/14/2015   PRIMARY LEARNER Patient   PRIMARY LANGUAGE ENGLISH   LEARNER PREFERENCE PRIMARY DEMONSTRATION   ANSWERED BY Kemar Mcneill   RELATIONSHIP SELF       Abuse Screening:  Abuse Screening Questionnaire 12/14/2015   Do you ever feel afraid of your partner? N   Are you in a relationship with someone who physically or mentally threatens you? N   Is it safe for you to go home? Y       Fall Risk  No flowsheet data found. Health Maintenance reviewed and discussed per provider. Yes; none due. Advance Directive:  1.  Do you have an advance directive in place? Patient Reply: No     2. If not, would you like material regarding how to put one in place? Patient Reply: no     Coordination of Care:  1. Have you been to the ER, urgent care clinic since your last visit? Hospitalized since your last visit? No     2. Have you seen or consulted any other health care providers outside of the [address] [place] [address] since your last visit?  No

## 2018-02-23 NOTE — PROGRESS NOTES
HISTORY OF PRESENT ILLNESS  Kash Rawls is a 40 y.o. female. Flank Pain    The history is provided by the patient. This is a new problem. The current episode started more than 1 week ago. The problem has been gradually worsening. The problem occurs constantly. Patient reports not work related injury. The pain is present in the right side. The quality of the pain is described as aching. The pain does not radiate. The pain is at a severity of 5/10. The pain is moderate. Exacerbated by: coughing. The pain is the same all the time. Associated symptoms include headaches. Pertinent negatives include no chest pain, no fever, no abdominal pain, no abdominal swelling, no perianal numbness, no bladder incontinence, no dysuria, no pelvic pain, no leg pain, no paresthesias and no paresis. She has tried NSAIDs for the symptoms. The treatment provided mild relief. Review of Systems   Constitutional: Positive for malaise/fatigue. Negative for chills and fever. HENT: Positive for congestion. Negative for sinus pain and sore throat. Respiratory: Positive for cough. Negative for sputum production, shortness of breath and wheezing. Cardiovascular: Negative for chest pain, palpitations and orthopnea. Gastrointestinal: Negative for abdominal pain, heartburn, nausea and vomiting. Genitourinary: Positive for flank pain. Negative for bladder incontinence, dysuria, frequency, hematuria, pelvic pain and urgency. Musculoskeletal: Negative for myalgias. Neurological: Positive for headaches. Negative for dizziness and paresthesias. Endo/Heme/Allergies: Negative for environmental allergies and polydipsia. Psychiatric/Behavioral: The patient is not nervous/anxious. Physical Exam   Constitutional: She is oriented to person, place, and time. She appears well-developed and well-nourished. No distress. HENT:   Head: Normocephalic and atraumatic.    Right Ear: External ear normal.   Left Ear: External ear normal.   Mouth/Throat: Oropharynx is clear and moist. No oropharyngeal exudate. Eyes: EOM are normal. Pupils are equal, round, and reactive to light. Neck: Normal range of motion. Neck supple. Cardiovascular: Normal rate and regular rhythm. Pulmonary/Chest: Effort normal and breath sounds normal. No respiratory distress. She has no wheezes. Abdominal: Soft. Bowel sounds are normal. She exhibits no distension. There is no tenderness. There is CVA tenderness. There is no rigidity, no tenderness at McBurney's point and negative Philippe's sign. Lymphadenopathy:     She has no cervical adenopathy. Neurological: She is alert and oriented to person, place, and time. Skin: Skin is dry. She is not diaphoretic. No erythema. Psychiatric: She has a normal mood and affect. Nursing note and vitals reviewed. ASSESSMENT and PLAN    ICD-10-CM ICD-9-CM    1. Cough R05 786.2 CBC WITH AUTOMATED DIFF      METABOLIC PANEL, COMPREHENSIVE      AMB POC URINALYSIS DIP STICK AUTO W/O MICRO      XR CHEST PA LAT      cyclobenzaprine (FLEXERIL) 5 mg tablet      ibuprofen (MOTRIN) 800 mg tablet      CULTURE, URINE   2. Chest wall pain R07.89 786.52 CBC WITH AUTOMATED DIFF      METABOLIC PANEL, COMPREHENSIVE      AMB POC URINALYSIS DIP STICK AUTO W/O MICRO      XR CHEST PA LAT      cyclobenzaprine (FLEXERIL) 5 mg tablet      ibuprofen (MOTRIN) 800 mg tablet      CULTURE, URINE   3. Flank pain R10.9 789.09 CBC WITH AUTOMATED DIFF      METABOLIC PANEL, COMPREHENSIVE      AMB POC URINALYSIS DIP STICK AUTO W/O MICRO      XR CHEST PA LAT      cyclobenzaprine (FLEXERIL) 5 mg tablet      ibuprofen (MOTRIN) 800 mg tablet      CULTURE, URINE   4.  Fatigue, unspecified type R53.83 780.79 CBC WITH AUTOMATED DIFF      METABOLIC PANEL, COMPREHENSIVE      AMB POC URINALYSIS DIP STICK AUTO W/O MICRO      XR CHEST PA LAT      cyclobenzaprine (FLEXERIL) 5 mg tablet      ibuprofen (MOTRIN) 800 mg tablet      CULTURE, URINE   further plan will be established according to lab and test results.

## 2018-02-23 NOTE — MR AVS SNAPSHOT
Eula Obrien 
 
 
 [address] [facility] [address] [id number] 
[phone] Patient: Cristopher Hammond MRN: [id number] LFB:3/1/1958 Visit Information Date & Time Provider Department Dept. Phone Encounter #  
 2/23/2018  2:45 PM Emile Sacks, NP [facility] [phone] [id number] Upcoming Health Maintenance Date Due  
 PAP AKA CERVICAL CYTOLOGY 12/14/2018 DTaP/Tdap/Td series (2 - Td) 12/14/2025 Allergies as of 2/23/2018  Review Complete On: 2/23/2018 By: Peace Durbin LPN Severity Noted Reaction Type Reactions Cephalexin  04/05/2012    Hives Current Immunizations  Never Reviewed Name Date  
 TB Skin Test (PPD) Intradermal 7/18/2015 Tdap 12/14/2015  5:09 PM  
  
 Not reviewed this visit You Were Diagnosed With   
  
 Codes Comments Cough    -  Primary ICD-10-CM: Y72 ICD-9-CM: 242. 2 Chest wall pain     ICD-10-CM: R07.89 ICD-9-CM: 786.52 Flank pain     ICD-10-CM: R10.9 ICD-9-CM: 789.09 Fatigue, unspecified type     ICD-10-CM: R53.83 ICD-9-CM: 780.79 Vitals BP Pulse Temp Resp Height(growth percentile) Weight(growth percentile) 127/83 (BP 1 Location: Left arm, BP Patient Position: Sitting) 64 97.8 °F (36.6 °C) (Oral) 16 5' 6\" (1.676 m) 174 lb (78.9 kg) LMP SpO2 BMI OB Status Smoking Status 02/23/2018 100% 28.08 kg/m2 Having regular periods Never Smoker Vitals History BMI and BSA Data Body Mass Index Body Surface Area 28.08 kg/m 2 1.92 m 2 Preferred Pharmacy Pharmacy Name Phone [facility] Your Updated Medication List  
  
   
This list is accurate as of 2/23/18  3:07 PM.  Always use your most recent med list.  
  
  
  
  
 buPROPion  mg tablet Commonly known as:  Vergil Bunkers Take 1 Tab by mouth every morning. cyclobenzaprine 5 mg tablet Commonly known as:  FLEXERIL Take 1 Tab by mouth nightly for 10 days. HAIR AND SKIN PO Take  by mouth. ibuprofen 800 mg tablet Commonly known as:  MOTRIN Take 1 Tab by mouth every eight (8) hours as needed for Pain for up to 10 days. multivitamin tablet Commonly known as:  ONE A DAY Take 1 Tab by mouth daily. VITAMIN B-12 500 mcg tablet Generic drug:  cyanocobalamin Take 500 mcg by mouth daily. Prescriptions Sent to Pharmacy Refills  
 cyclobenzaprine (FLEXERIL) 5 mg tablet 0 Sig: Take 1 Tab by mouth nightly for 10 days. Class: Normal  
 [facility] [address] [facility] Ph #: [phone] Route: Oral  
 ibuprofen (MOTRIN) 800 mg tablet 0 Sig: Take 1 Tab by mouth every eight (8) hours as needed for Pain for up to 10 days. Class: Normal  
 Pharmacy: [facility], [address] [place] [address] 28 Ph #: [phone] Route: Oral  
  
We Performed the Following AMB POC URINALYSIS DIP STICK AUTO W/O MICRO [34841 CPT(R)] To-Do List   
 02/23/2018 Lab:  CBC WITH AUTOMATED DIFF   
  
 02/23/2018 Lab:  METABOLIC PANEL, COMPREHENSIVE   
  
 02/23/2018 Imaging:  XR CHEST PA LAT Introducing [facility]! Dear Carmela: 
Thank you for requesting a [facility] account. Our records indicate that you already have an active [facility] account. You can access your account anytime at [email or web address] Did you know that you can access your hospital and ER discharge instructions at any time in [facility]? You can also review all of your test results from your hospital stay or ER visit. Additional Information If you have questions, please visit the Frequently Asked Questions section of the [facility] website at [email or web address] Remember, [facility] is NOT to be used for urgent needs. For medical emergencies, dial 911. Now available from your iPhone and Android! Please provide this summary of care documentation to your next provider. Your primary care clinician is listed as [facility]. If you have any questions after today's visit, please call [phone].

## 2018-02-23 NOTE — ACP (ADVANCE CARE PLANNING)
Advance Directive:  1. Do you have an advance directive in place? Patient Reply: No     2. If not, would you like material regarding how to put one in place?  Patient Reply: no

## 2018-02-25 LAB
A-G RATIO,AGRAT: 2 RATIO (ref 1.1–2.6)
ABSOLUTE LYMPHOCYTE COUNT, 10803: 0.9 K/UL (ref 1–4.8)
ALBUMIN SERPL-MCNC: 4.7 G/DL (ref 3.5–5)
ALP SERPL-CCNC: 54 U/L (ref 25–115)
ALT SERPL-CCNC: 12 U/L (ref 5–40)
ANION GAP SERPL CALC-SCNC: 17 MMOL/L
AST SERPL W P-5'-P-CCNC: 17 U/L (ref 10–37)
BASOPHILS # BLD: 0 K/UL (ref 0–0.2)
BASOPHILS NFR BLD: 1 % (ref 0–2)
BILIRUB SERPL-MCNC: 0.3 MG/DL (ref 0.2–1.2)
BUN SERPL-MCNC: 8 MG/DL (ref 6–22)
CALCIUM SERPL-MCNC: 9.4 MG/DL (ref 8.4–10.5)
CHLORIDE SERPL-SCNC: 99 MMOL/L (ref 98–110)
CO2 SERPL-SCNC: 23 MMOL/L (ref 20–32)
CREAT SERPL-MCNC: 0.7 MG/DL (ref 0.5–1.2)
EOSINOPHIL # BLD: 0 K/UL (ref 0–0.5)
EOSINOPHIL NFR BLD: 0 % (ref 0–6)
ERYTHROCYTE [DISTWIDTH] IN BLOOD BY AUTOMATED COUNT: 13.9 % (ref 10–16)
GFRAA, 66117: >60
GFRNA, 66118: >60
GLOBULIN,GLOB: 2.3 G/DL (ref 2–4)
GLUCOSE SERPL-MCNC: 84 MG/DL (ref 70–99)
GRANULOCYTES,GRANS: 52 % (ref 40–75)
HCT VFR BLD AUTO: 41.3 % (ref 35.1–46.5)
HGB BLD-MCNC: 13.6 G/DL (ref 11.7–15.5)
LYMPHOCYTES, LYMLT: 31 % (ref 27–45)
MCH RBC QN AUTO: 32 PG (ref 26–34)
MCHC RBC AUTO-ENTMCNC: 33 G/DL (ref 32–36)
MCV RBC AUTO: 97 FL (ref 80–95)
MONOCYTES # BLD: 0.5 K/UL (ref 0.1–0.9)
MONOCYTES NFR BLD: 16 % (ref 3–9)
NEUTROPHILS # BLD AUTO: 1.5 K/UL (ref 1.8–7.7)
PLATELET # BLD AUTO: 281 K/UL (ref 140–440)
PMV BLD AUTO: 10.4 FL (ref 6–10.8)
POTASSIUM SERPL-SCNC: 4.7 MMOL/L (ref 3.5–5.5)
PROT SERPL-MCNC: 7 G/DL (ref 6.4–8.3)
RBC # BLD AUTO: 4.24 M/UL (ref 3.8–5.2)
RESULT: NORMAL
SODIUM SERPL-SCNC: 139 MMOL/L (ref 133–145)
WBC # BLD AUTO: 2.8 K/UL (ref 4–11)

## 2019-04-08 ENCOUNTER — OFFICE VISIT (OUTPATIENT)
Dept: INTERNAL MEDICINE CLINIC | Age: 39
End: 2019-04-08

## 2019-04-08 VITALS
DIASTOLIC BLOOD PRESSURE: 88 MMHG | SYSTOLIC BLOOD PRESSURE: 135 MMHG | HEIGHT: 66 IN | OXYGEN SATURATION: 98 % | HEART RATE: 70 BPM | TEMPERATURE: 98.6 F | WEIGHT: 190 LBS | RESPIRATION RATE: 14 BRPM | BODY MASS INDEX: 30.53 KG/M2

## 2019-04-08 DIAGNOSIS — S05.01XA ABRASION OF RIGHT CORNEA, INITIAL ENCOUNTER: Primary | ICD-10-CM

## 2019-04-08 RX ORDER — MOXIFLOXACIN 5 MG/ML
1 SOLUTION/ DROPS OPHTHALMIC 3 TIMES DAILY
Qty: 1 BOTTLE | Refills: 0 | Status: SHIPPED | OUTPATIENT
Start: 2019-04-08 | End: 2019-04-15

## 2019-04-08 NOTE — PROGRESS NOTES
Patient presents to the clinic for right eye irritation that began 1 week ago. Patient would like to discuss depression and being placed on medications.

## 2019-04-08 NOTE — PROGRESS NOTES
HISTORY OF PRESENT ILLNESS  Santhosh Uribe is a 44 y.o. female. HPI  Ms. Hernandes presents for right eye irritation x 1 week. No eye redness. She states it feels as though something is in her eye, but she does not recall any foreign body exposure. Over the past week, symptoms have been relatively unchanged. - She wears glasses. No contact use in over a year. - She has been using OTC lubricant eye drops and saline eye wash. Review of Systems   Eyes: Positive for pain. Negative for blurred vision, double vision, discharge and redness. Visit Vitals  /88 (BP 1 Location: Left arm, BP Patient Position: Sitting)   Pulse 70   Temp 98.6 °F (37 °C) (Oral)   Resp 14   Ht 5' 6\" (1.676 m)   Wt 190 lb (86.2 kg)   LMP 04/08/2019 (Exact Date)   SpO2 98%   BMI 30.67 kg/m²       Physical Exam   Constitutional: She is oriented to person, place, and time. She appears well-developed and well-nourished. No distress. HENT:   Head: Normocephalic and atraumatic. Eyes: Pupils are equal, round, and reactive to light. Conjunctivae, EOM and lids are normal. Lids are everted and swept, no foreign bodies found. Right eye exhibits no discharge, no exudate and no hordeolum. No foreign body present in the right eye. Left eye exhibits no discharge, no exudate and no hordeolum. No foreign body present in the left eye. No scleral icterus. Slit lamp exam:       The right eye shows fluorescein uptake (Pinpoint round area 6:00 position of cornea with uptake. ). Neurological: She is alert and oriented to person, place, and time. Skin: Skin is warm and dry. Psychiatric: She has a normal mood and affect. Her behavior is normal.       ASSESSMENT and PLAN  Diagnoses and all orders for this visit:    1. Abrasion of right cornea, initial encounter  -     REFERRAL TO OPHTHALMOLOGY   - Appt tomorrow am with Dr. Dawn Mejía at 9:45. Patient agreeable.   -     moxifloxacin (VIGAMOX) 0.5 % ophthalmic solution; Administer 1 Drop to right eye three (3) times daily for 7 days. Follow-up and Dispositions    · Return for ophthalmology.

## 2019-05-01 ENCOUNTER — OFFICE VISIT (OUTPATIENT)
Dept: INTERNAL MEDICINE CLINIC | Age: 39
End: 2019-05-01

## 2019-05-01 VITALS
HEIGHT: 66 IN | OXYGEN SATURATION: 99 % | DIASTOLIC BLOOD PRESSURE: 80 MMHG | SYSTOLIC BLOOD PRESSURE: 116 MMHG | HEART RATE: 75 BPM | WEIGHT: 189 LBS | BODY MASS INDEX: 30.37 KG/M2 | RESPIRATION RATE: 20 BRPM | TEMPERATURE: 98.9 F

## 2019-05-01 DIAGNOSIS — F32.A MOOD DISORDER OF DEPRESSED TYPE: ICD-10-CM

## 2019-05-01 DIAGNOSIS — R63.5 WEIGHT GAIN: ICD-10-CM

## 2019-05-01 RX ORDER — BUPROPION HYDROCHLORIDE 150 MG/1
150 TABLET ORAL
Qty: 30 TAB | Refills: 5 | Status: SHIPPED | OUTPATIENT
Start: 2019-05-01

## 2019-05-01 NOTE — PROGRESS NOTES
HISTORY OF PRESENT ILLNESS Nely Perez is a 44 y.o. female. Visit Vitals /80 (BP 1 Location: Right arm, BP Patient Position: Sitting) Pulse 75 Temp 98.9 °F (37.2 °C) (Oral) Resp 20 Ht 5' 6\" (1.676 m) Wt 189 lb (85.7 kg) LMP 04/08/2019 (Exact Date) SpO2 99% BMI 30.51 kg/m² Has noted the signs and sxs of depression. No energy, lost interest in things, apathetic and disinterest. Has nit tried any OTC remedies. Is pushing herself to exercise and eat better. Just feels \"flat. \" 
 
She has been on wellbutrin in the past. 
 
She has gained 16# since last year and just does not feel well. She has joined [employer]. Depression The history is provided by the patient. This is a recurrent problem. The current episode started more than 1 week ago. Review of Systems Psychiatric/Behavioral: Positive for depression. Negative for suicidal ideas. The patient is not nervous/anxious. Physical Exam  
Constitutional: She is oriented to person, place, and time. She appears well-developed and well-nourished. No distress. Cardiovascular: Normal rate. Pulmonary/Chest: Effort normal.  
Neurological: She is alert and oriented to person, place, and time. Skin: Skin is warm and dry. She is not diaphoretic. Psychiatric: She has a normal mood and affect. Her behavior is normal. Judgment and thought content normal.  
Nursing note and vitals reviewed. ASSESSMENT and PLAN 
  ICD-10-CM ICD-9-CM 1. Mood disorder of depressed type F32.9 311 buPROPion XL (WELLBUTRIN XL) 150 mg tablet 2. Weight gain R63.5 783.1 buPROPion XL (WELLBUTRIN XL) 150 mg tablet Will restart wellbutrin for now She does have some anxiety with this Discussed BMI/weight, lifestyle, diet and exercise. Discussed effect on blood pressure, blood sugar, and joints especially Focus on limiting white carbs, portion control, and moving more. wellbutrin may help this and she has taken charge and is going to weigh watchers F/u 5-6 weeks for recheck

## 2019-05-01 NOTE — PROGRESS NOTES
ROOM # 5 Henry Plan presents today for Chief Complaint Patient presents with  Depression Wilberyennifestus Khangarlene Yan-Parish preferred language for health care discussion is english/other. Is someone accompanying this pt? no 
 
Is the patient using any DME equipment during [address]? no 
 
Depression Screening: 
3 most recent [facility] Screens 4/11/2017 12/14/2015 Little interest or pleasure in doing things Several days Nearly every day Feeling down, depressed, irritable, or hopeless Several days More than half the days Total Score PHQ 2 2 5 Trouble falling or staying asleep, or sleeping too much Several days - Feeling tired or having little energy Several days - Poor appetite, weight loss, or overeating Not at all - Feeling bad about yourself - or that you are a failure or have let yourself or your family down Several days - Trouble concentrating on things such as school, work, reading, or watching TV Several days - Moving or speaking so slowly that other people could have noticed; or the opposite being so fidgety that others notice Not at all - Thoughts of being better off dead, or hurting yourself in some way Not at all -  
PHQ 9 Score 6 - How difficult have these problems made it for you to do your work, take care of your home and get along with others Not difficult at all - Learning Assessment: 
Learning Assessment 12/14/2015 PRIMARY LEARNER Patient PRIMARY LANGUAGE ENGLISH  
LEARNER PREFERENCE PRIMARY DEMONSTRATION  
ANSWERED BY Greg Islas RELATIONSHIP SELF Abuse Screening: 
Abuse Screening Questionnaire 12/14/2015 Do you ever feel afraid of your partner? Rajni Valenzuela Are you in a relationship with someone who physically or mentally threatens you? Rajni Valenzuela Is it safe for you to go home? Gabrielle Zhang Fall Risk No flowsheet data found. Health Maintenance reviewed and discussed per provider. Yes Henry Plan is due for Health Maintenance Due  
 Topic Date Due  
 PAP AKA CERVICAL CYTOLOGY  12/14/2018 Please order/place referral if appropriate. Advance Directive: 1. Do you have an advance directive in place? Patient Reply: no 
 
2. If not, would you like material regarding how to put one in place? Patient Reply: no 
 
Coordination of Care: 1. Have you been to the ER, urgent care clinic since your last visit? Hospitalized since your last visit? no 
 
2. Have you seen or consulted any other health care providers outside of the [address] [place] [address] since your last visit? Include any pap smears or colon screening.  no

## 2022-03-19 PROBLEM — R10.9 FLANK PAIN: Status: ACTIVE | Noted: 2018-02-23

## 2022-03-19 PROBLEM — R07.89 CHEST WALL PAIN: Status: ACTIVE | Noted: 2018-02-23

## 2022-03-19 PROBLEM — R53.83 FATIGUE: Status: ACTIVE | Noted: 2018-02-23

## 2022-03-19 PROBLEM — R05.9 COUGH: Status: ACTIVE | Noted: 2018-02-23

## 2022-03-20 PROBLEM — F32.A DEPRESSION: Status: ACTIVE | Noted: 2017-01-03

## 2023-02-03 ENCOUNTER — TRANSCRIBE ORDER (OUTPATIENT)
Dept: GENERAL RADIOLOGY | Age: 43
End: 2023-02-03

## 2023-02-03 DIAGNOSIS — R07.89 OTHER CHEST PAIN: Primary | ICD-10-CM

## 2023-04-22 DIAGNOSIS — R07.89 OTHER CHEST PAIN: Primary | ICD-10-CM

## 2023-05-20 RX ORDER — BUPROPION HYDROCHLORIDE 150 MG/1
150 TABLET ORAL
COMMUNITY
Start: 2019-05-01